# Patient Record
Sex: MALE | Race: WHITE | NOT HISPANIC OR LATINO | Employment: UNEMPLOYED | URBAN - METROPOLITAN AREA
[De-identification: names, ages, dates, MRNs, and addresses within clinical notes are randomized per-mention and may not be internally consistent; named-entity substitution may affect disease eponyms.]

---

## 2017-01-03 ENCOUNTER — OFFICE VISIT (OUTPATIENT)
Dept: INTERNAL MEDICINE | Facility: CLINIC | Age: 27
End: 2017-01-03

## 2017-01-03 VITALS
TEMPERATURE: 97.2 F | RESPIRATION RATE: 16 BRPM | WEIGHT: 230 LBS | DIASTOLIC BLOOD PRESSURE: 92 MMHG | HEIGHT: 71 IN | HEART RATE: 88 BPM | BODY MASS INDEX: 32.2 KG/M2 | SYSTOLIC BLOOD PRESSURE: 146 MMHG

## 2017-01-03 DIAGNOSIS — R03.0 PREHYPERTENSION: ICD-10-CM

## 2017-01-03 DIAGNOSIS — M06.9 RHEUMATOID ARTHRITIS INVOLVING MULTIPLE SITES, UNSPECIFIED RHEUMATOID FACTOR PRESENCE: Primary | ICD-10-CM

## 2017-01-03 DIAGNOSIS — L72.0 EPIDERMOID CYST: ICD-10-CM

## 2017-01-03 PROCEDURE — 99203 OFFICE O/P NEW LOW 30 MIN: CPT | Performed by: FAMILY MEDICINE

## 2017-01-03 RX ORDER — OMEPRAZOLE 20 MG/1
20 CAPSULE, DELAYED RELEASE ORAL
COMMUNITY

## 2017-01-03 RX ORDER — INDOMETHACIN 75 MG/1
75 CAPSULE, EXTENDED RELEASE ORAL
COMMUNITY

## 2017-01-03 NOTE — PROGRESS NOTES
Subjective   Bob Romero is a 26 y.o. male.     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  He is followed by Dr. Stokes for rheumatoid arthritis on Enbrel.  It is been quite effective.  He has some borderline pre-hypertension.  Systolic blood pressure is been running in the 90s on a few measurements.    Otherwise risk factors and dietary measures are discussed with him.    He is seen Dr. Zhou for removal of epidermoid cyst in the right groin area.  He has epidermoid cyst symptomatic in the left groin area and will require referral for removal of that as well.      The following portions of the patient's history were reviewed and updated as appropriate: allergies, current medications, past social history and problem list.    Review of Systems   Constitutional: Negative.    HENT: Negative.    Eyes: Negative.    Respiratory: Negative.    Cardiovascular: Negative.    Gastrointestinal: Negative.    Endocrine: Negative.    Genitourinary: Negative.    Musculoskeletal: Positive for arthralgias.   Skin: Negative.    Allergic/Immunologic: Negative.    Neurological: Negative.    Hematological: Negative.    Psychiatric/Behavioral: Negative.        Objective   Vitals:    01/03/17 0930   BP: 146/92   Pulse: 88   Resp: 16   Temp: 97.2 °F (36.2 °C)     Physical Exam   Constitutional: He is oriented to person, place, and time. He appears well-developed and well-nourished.   HENT:   Head: Normocephalic and atraumatic.   Right Ear: Tympanic membrane and external ear normal.   Left Ear: Tympanic membrane and external ear normal.   Nose: Nose normal.   Mouth/Throat: Oropharynx is clear and moist.   Eyes: Conjunctivae and EOM are normal. Pupils are equal, round, and reactive to light.   Neck: Normal range of motion. Neck supple. No JVD present. No thyromegaly present.   Cardiovascular: Normal rate, regular  rhythm, normal heart sounds and intact distal pulses.    Pulmonary/Chest: Effort normal and breath sounds normal.   Abdominal: Soft. Bowel sounds are normal.   Musculoskeletal: Normal range of motion.   Lymphadenopathy:     He has no cervical adenopathy.   Neurological: He is alert and oriented to person, place, and time. No cranial nerve deficit. Coordination normal.   Skin: Skin is warm and dry. No rash noted.        Psychiatric: He has a normal mood and affect. His behavior is normal. Judgment and thought content normal.   Vitals reviewed.      Assessment/Plan   Problem List Items Addressed This Visit     None      Visit Diagnoses     Rheumatoid arthritis involving multiple sites, unspecified rheumatoid factor presence    -  Primary    Prehypertension        Epidermoid cyst           monitor blood pressure outside the office.  Recheck in about 5 months.  Continue follow-up with rheumatology Dr. Stokes.  She does labs there showed a repeat labs here.  Referral to Dr. Zhou for epidermoid cyst removal.

## 2017-01-03 NOTE — MR AVS SNAPSHOT
Bob Romero   1/3/2017 9:00 AM   Office Visit    Dept Phone:  [phone]   Encounter #:  [id number]    Provider:  Wade Cruz Jr., MD   Department:  [facility] INTERNAL MEDICINE                Your Full Care Plan              Your Updated Medication List          This list is accurate as of: 1/3/17 10:20 AM.  Always use your most recent med list.                ENBREL 50 MG/ML solution prefilled syringe injection   Generic drug:  etanercept       indomethacin SR 75 MG CR capsule   Commonly known as:  INDOCIN SR       omeprazole 20 MG capsule   Commonly known as:  priLOSEC               We Performed the Following     Ambulatory Referral to General Surgery       You Were Diagnosed With        Codes Comments    Rheumatoid arthritis involving multiple sites, unspecified rheumatoid factor presence    -  Primary ICD-10-CM: M06.9  ICD-9-CM: 714.0     Prehypertension     ICD-10-CM: R03.0  ICD-9-CM: 796.2     Epidermoid cyst     ICD-10-CM: L72.0  ICD-9-CM: 706.2       Instructions     None    Patient Instructions History      Upcoming Appointments     Visit Type Date Time Department    NEW PATIENT 1/3/2017  9:00 AM [facility]  [facility] 1 6920      [employer] Signup     [facility] [employer] allows you to send messages to your doctor, view your test results, renew your prescriptions, schedule appointments, and more. To sign up, go to [employer] and click on the Sign Up Now link in the New User? box. Enter your [employer] Activation Code exactly as it appears below along with the last four digits of your Social Security Number and your Date of Birth () to complete the sign-up process. If you do not sign up before the expiration date, you must request a new code.    [employer] Activation Code: [email or web address]  Expires: 2017 10:20 AM    If you have questions, you can email [email or web address] or call [phone] to talk to our [employer] staff. Remember, [employer] is  "NOT to be used for urgent needs. For medical emergencies, dial 911.               Other Info from Your Visit           Allergies     Tetracyclines & Related        Reason for Visit     Hypertension     Rheumatoid arhritis           Vital Signs     Blood Pressure Pulse Temperature Respirations Height Weight    146/92 (BP Location: Left arm, Patient Position: Sitting, Cuff Size: Adult) 88 97.2 °F (36.2 °C) (Tympanic) 16 71\" (180.3 cm) 230 lb (104 kg)    Body Mass Index Smoking Status                32.08 kg/m2 Former Smoker          Problems and Diagnoses Noted     Rheumatoid arthritis involving multiple sites, unspecified rheumatoid factor presence    -  Primary    Borderline high blood pressure        Epidermoid cyst            "

## 2017-05-25 ENCOUNTER — OFFICE VISIT (OUTPATIENT)
Dept: INTERNAL MEDICINE | Facility: CLINIC | Age: 27
End: 2017-05-25

## 2017-05-25 VITALS
SYSTOLIC BLOOD PRESSURE: 152 MMHG | WEIGHT: 227 LBS | RESPIRATION RATE: 16 BRPM | DIASTOLIC BLOOD PRESSURE: 98 MMHG | HEART RATE: 104 BPM | BODY MASS INDEX: 31.66 KG/M2 | TEMPERATURE: 97.6 F

## 2017-05-25 DIAGNOSIS — Z02.0 SCHOOL PHYSICAL EXAM: ICD-10-CM

## 2017-05-25 DIAGNOSIS — M06.9 RHEUMATOID ARTHRITIS INVOLVING MULTIPLE SITES, UNSPECIFIED RHEUMATOID FACTOR PRESENCE: ICD-10-CM

## 2017-05-25 DIAGNOSIS — B00.1 COLD SORE: ICD-10-CM

## 2017-05-25 DIAGNOSIS — Z20.2 POTENTIAL EXPOSURE TO STD: Primary | ICD-10-CM

## 2017-05-25 DIAGNOSIS — Z11.1 SCREENING-PULMONARY TB: ICD-10-CM

## 2017-05-25 PROCEDURE — 99213 OFFICE O/P EST LOW 20 MIN: CPT | Performed by: FAMILY MEDICINE

## 2017-05-25 PROCEDURE — SPORT / SCHOOL: Performed by: FAMILY MEDICINE

## 2017-05-25 RX ORDER — VALACYCLOVIR HYDROCHLORIDE 1 G/1
TABLET, FILM COATED ORAL
Qty: 4 TABLET | Refills: 12 | Status: SHIPPED | OUTPATIENT
Start: 2017-05-25 | End: 2019-02-26 | Stop reason: SDUPTHER

## 2017-05-25 RX ORDER — VALACYCLOVIR HYDROCHLORIDE 1 G/1
TABLET, FILM COATED ORAL
Qty: 4 TABLET | Refills: 12 | Status: SHIPPED | OUTPATIENT
Start: 2017-05-25 | End: 2017-05-25 | Stop reason: SDUPTHER

## 2017-05-30 LAB
HCV AB S/CO SERPL IA: <0.1 S/CO RATIO (ref 0–0.9)
HIV 1+2 AB+HIV1 P24 AG SERPL QL IA: NON REACTIVE
HSV1 IGG SER IA-ACNC: 52.6 INDEX (ref 0–0.9)
HSV1 IGM TITR SER IF: NORMAL TITER
HSV2 IGG SER IA-ACNC: <0.91 INDEX (ref 0–0.9)
HSV2 IGM TITR SER IF: NORMAL TITER
RPR SER QL: NORMAL

## 2017-06-03 LAB
ANNOTATION COMMENT IMP: NORMAL
GAMMA INTERFERON BACKGROUND BLD IA-ACNC: 0.04 IU/ML
M TB IFN-G BLD-IMP: NEGATIVE
M TB IFN-G CD4+ BCKGRND COR BLD-ACNC: 0 IU/ML
M TB IFN-G CD4+ T-CELLS BLD-ACNC: 0.04 IU/ML
MITOGEN IGNF BLD-ACNC: 9.5 IU/ML
QUANTIFERON INCUBATION: NORMAL
SERVICE CMNT-IMP: NORMAL

## 2019-02-26 ENCOUNTER — TELEPHONE (OUTPATIENT)
Dept: INTERNAL MEDICINE | Facility: CLINIC | Age: 29
End: 2019-02-26

## 2019-02-26 RX ORDER — VALACYCLOVIR HYDROCHLORIDE 1 G/1
TABLET, FILM COATED ORAL
Qty: 4 TABLET | Refills: 4 | Status: SHIPPED | OUTPATIENT
Start: 2019-02-26

## 2019-02-26 NOTE — TELEPHONE ENCOUNTER
Pt is needing a refill on his Valacyclovir but he hasn't seen you since 5/2017.  He is in [place] in [place] and plans on returning and keeping you as his PCP  Is it ok to refill his RX even though he hasn't seen you in almost 2 years?

## 2021-03-16 ENCOUNTER — IMMUNIZATION (OUTPATIENT)
Dept: VACCINE CLINIC | Facility: HOSPITAL | Age: 31
End: 2021-03-16

## 2021-03-16 PROCEDURE — 91300 HC SARSCOV02 VAC 30MCG/0.3ML IM: CPT | Performed by: OBSTETRICS & GYNECOLOGY

## 2021-03-16 PROCEDURE — 0001A: CPT | Performed by: OBSTETRICS & GYNECOLOGY

## 2021-03-17 ENCOUNTER — TELEPHONE (OUTPATIENT)
Dept: INTERNAL MEDICINE | Facility: CLINIC | Age: 31
End: 2021-03-17

## 2021-03-17 NOTE — TELEPHONE ENCOUNTER
Caller: Bob Romero    Relationship to patient: Self    Best call back number: [phone]    Patient is needing: PATIENT HAS NOT SEEN DR CARVAJAL SINCE 2017. 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 PATIENT IS ASKING TO RE-ESTABLISH WITH DR. CARVAJAL. HUB COULD NOT FIND ANY NEW PATIENT SLOTS OPEN.     PATIENT IS ALSO ASKING IF DR CARVAJAL WOULD SEND 1 REFILL OF THE FOLLOWING MEDICATION. PATIENT WILL NEED THIS MEDICATION UNTIL HE CAN GET IN WITH A PRIMARY CARE    valACYclovir (VALTREX) 1000 MG tablet     [facility] [address] [place] [facility] - [address] - [phone] PH - [phone] FX  [phone]

## 2021-03-18 NOTE — TELEPHONE ENCOUNTER
Called and left a message for the pt that he would not be able to re establish since Dr Cruz is not taking new patients at this time.  I left the number for the [employer] liason and also advised our NP's are taking new patients if he wants to establish with one of them.

## 2021-04-06 ENCOUNTER — IMMUNIZATION (OUTPATIENT)
Dept: VACCINE CLINIC | Facility: HOSPITAL | Age: 31
End: 2021-04-06

## 2021-04-06 PROCEDURE — 91300 HC SARSCOV02 VAC 30MCG/0.3ML IM: CPT | Performed by: OBSTETRICS & GYNECOLOGY

## 2021-04-06 PROCEDURE — 0002A: CPT | Performed by: OBSTETRICS & GYNECOLOGY
